# Patient Record
Sex: MALE | Race: BLACK OR AFRICAN AMERICAN
[De-identification: names, ages, dates, MRNs, and addresses within clinical notes are randomized per-mention and may not be internally consistent; named-entity substitution may affect disease eponyms.]

---

## 2017-04-28 ENCOUNTER — HOSPITAL ENCOUNTER (EMERGENCY)
Dept: HOSPITAL 47 - EC | Age: 67
Discharge: HOME | End: 2017-04-28
Payer: MEDICARE

## 2017-04-28 VITALS
RESPIRATION RATE: 18 BRPM | SYSTOLIC BLOOD PRESSURE: 150 MMHG | TEMPERATURE: 97.7 F | DIASTOLIC BLOOD PRESSURE: 81 MMHG | HEART RATE: 55 BPM

## 2017-04-28 DIAGNOSIS — Z79.02: ICD-10-CM

## 2017-04-28 DIAGNOSIS — I73.9: ICD-10-CM

## 2017-04-28 DIAGNOSIS — Z79.899: ICD-10-CM

## 2017-04-28 DIAGNOSIS — K21.9: ICD-10-CM

## 2017-04-28 DIAGNOSIS — E78.5: ICD-10-CM

## 2017-04-28 DIAGNOSIS — Z43.1: Primary | ICD-10-CM

## 2017-04-28 DIAGNOSIS — I69.351: ICD-10-CM

## 2017-04-28 DIAGNOSIS — Z87.01: ICD-10-CM

## 2017-04-28 DIAGNOSIS — Z87.891: ICD-10-CM

## 2017-04-28 DIAGNOSIS — I10: ICD-10-CM

## 2017-04-28 DIAGNOSIS — B19.20: ICD-10-CM

## 2017-04-28 PROCEDURE — 43760: CPT

## 2017-04-28 PROCEDURE — 99283 EMERGENCY DEPT VISIT LOW MDM: CPT

## 2017-04-28 PROCEDURE — 74000: CPT

## 2017-04-28 NOTE — XR
EXAMINATION TYPE: XR KUB portable

 

DATE OF EXAM: 4/28/2017 7:25 PM

 

COMPARISON: 10/30/2016

 

HISTORY: PEG tube evaluation.

 

TECHNIQUE: Single portable supine image was obtained of the mid abdomen after the administration of 2
5 mL of Omnipaque 350 through the percutaneous enteric gastric tube.

 

FINDINGS: Percutaneous enteric gastric tube overlies the mid central abdomen. After instillation of o
ral contrast through the PEG tube there is opacification of bowel, morphologically compatible with a 
gastric antrum and proximal duodenum. No evidence of contrast extravasation. Nonobstructive bowel gas
 pattern.

 

IMPRESSION: Appropriately placed percutaneous enterogastric tube with no evidence of contrast extrava
sation.

## 2017-04-28 NOTE — ED
Recheck HPI





- General


Chief Complaint: Recheck/Abnormal Lab/Rx


Stated Complaint: Peg Tube out


Time Seen by Provider: 17 18:00


Source: EMS, RN notes reviewed, old records reviewed


Mode of arrival: EMS


Limitations: language barrier, altered mental status, physical limitation





- History of Present Illness


Initial Comments: 





Patient is a 66-year-old male presents emergency room for evaluation of PEG 

tube replacement.  Patient was brought here from more worried nursing and rehab 

facility.  Patient has a PEG tube placed.  Apparently while transferring 

patient the PEG tube was pulled out.  Nursing staff at [facility] placed a Sheehan 

catheter until a PEG tube could be replaced.  Patient is nonverbal.  No one is 

present with patient.  Unsure on how long PEG tube has been placed.  Patient 

appears to be in no distress.  Patient has hemiplegia and hemiparesis on the 

right side.





- Related Data


 Home Medications











 Medication  Instructions  Recorded  Confirmed


 


Acetaminophen Tab [Tylenol] 650 mg PEG/G-TUBE Q4H PRN 16


 


Atenolol [Tenormin] 25 mg PEG/G-TUBE QAM 16


 


Cholecalciferol [Vitamin D3] 2,000 unit PEG/G-TUBE DAILY 16


 


Lisinopril 40 mg PEG/G-TUBE QAM 16


 


Clopidogrel [Plavix] 75 mg PEG/G-TUBE HS 10/28/16 04/28/17


 


Furosemide [Lasix] 40 mg PEG/G-TUBE MOWEFR 10/28/16 04/28/17


 


Potassium Chloride Oral Liquid 20 meq PEG/G-TUBE MOWEFR 10/28/16 04/28/17


 


levETIRAcetam [Keppra] 1,000 mg PEG/G-TUBE Q12HR 10/28/16 04/28/17


 


Acetaminophen [Tylenol] 650 mg PEG/G-TUBE DAILY 17


 


Famotidine [Pepcid] 20 mg PEG/G-TUBE BID 17


 


Fenofibrate Nanocrystallized 145 mg PEG/G-TUBE DAILY 17





[Tricor]   


 


Glycerin/Propylene Glycol 1 drop BOTH EYES TID 17





[Artificial Tears Drops]   


 


Vits A and D/White Pet/Lanolin [A 1 applic TOPICAL BID PRN 17





and D Ointment]   


 


amLODIPine [Norvasc] 5 mg PEG/G-TUBE DAILY 17








 Previous Rx's











 Medication  Instructions  Recorded


 


Ipratropium-Albuterol Nebulize 3 ml INHALATION RT-QID  ampul.neb 16





[Duoneb 0.5 mg-3 mg/3 ml Soln]  











 Allergies











Allergy/AdvReac Type Severity Reaction Status Date / Time


 


No Known Allergies Allergy   Verified 17 18:20














Review of Systems


ROS Statement: 


Those systems with pertinent positive or pertinent negative responses have been 

documented in the HPI.





ROS Other: All systems not noted in ROS Statement are negative.





Past Medical History


Past Medical History: COPD, CVA/TIA, GERD/Reflux, Hyperlipidemia, Hypertension, 

Liver Disease, Osteoarthritis (OA), Pneumonia, Prostate Disorder, Renal Disease

, Syncope


Additional Past Medical History / Comment(s): hepatitis c, pvd, Right side 

Flaccid


History of Any Multi-Drug Resistant Organisms: None Reported


Past Surgical History: No Surgical Hx Reported


Additional Past Surgical History / Comment(s): Patient had EGD and colonoscopy 

in the past, PEG tube placed 2016 when had   CVA, liver biopsy


Past Anesthesia/Blood Transfusion Reactions: No Reported Reaction


Past Psychological History: No Psychological Hx Reported


Additional Psychological History / Comment(s): was in [place], had some 

problems with PTSD, nothing current per family, Patient currently nonverbal


Smoking Status: Former smoker


Past Alcohol Use History: None Reported, Daily


Additional Past Alcohol Use History / Comment(s): nothing current


Past Drug Use History: Marijuana


Additional Drug Use History / Comment(s): no drugs used currently





- Past Family History


  ** Mother


Family Medical History: Myocardial Infarction (MI)


Additional Family Medical History / Comment(s): mom  of a heart attack in 70

's





  ** Father


Family Medical History: CVA/TIA, Myocardial Infarction (MI)


Additional Family Medical History / Comment(s): father  at age 71 of a 

stroke





  ** Brother(s)


Family Medical History: CVA/TIA, Liver Disease





  ** Sister(s)


Family Medical History: Vascular Disorder





  ** Daughter(s)


Family Medical History: No Reported History





  ** Son(s)


Additional Family Medical History / Comment(s): Patient has step son and three-

step daughters





General Exam





- General Exam Comments


Initial Comments: 





Laying in exam room, no acute distress.


Limitations: language barrier, altered mental status, physical limitation


General appearance: alert


Head exam: Present: atraumatic, normocephalic, normal inspection


Eye exam: Present: normal appearance


ENT exam: Present: normal exam


Neck exam: Present: normal inspection


Respiratory exam: Present: normal lung sounds bilaterally.  Absent: respiratory 

distress


Cardiovascular Exam: Present: regular rate, normal rhythm, normal heart sounds


GI/Abdominal exam: Present: soft, other (Sheehan catheter placed in upper 

epigastric.  No bleeding noted.  No erythema or swelling noted around the 

ostomy site.).  Absent: distended, tenderness, guarding, rebound, rigid


Extremities exam: Present: normal inspection


Back exam: Present: normal inspection


Neurological exam: Present: alert, altered


Psychiatric exam: Present: normal affect, normal mood


Skin exam: Present: warm, dry, intact, normal color





Course


 Vital Signs











  17





  18:11


 


Temperature 98.3 F


 


Pulse Rate 56 L


 


Respiratory 20





Rate 


 


Blood Pressure 107/61


 


O2 Sat by Pulse 97





Oximetry 














Procedures





- Feeding Tube Replacement


Reason for Replacement: fell out


Type of Tube: G-J Tube


Use of Tube: feedings only


Insertion Site Prior to Procedure: clean


Tube Used for Reinsertion: patient's own


Turkish Tube Size (F): 16


Balloon Size (mls): 20


Verification of Placement: KUB


Tube Secured by: tape/dressing


Patient Tolerated Procedure: well





Medical Decision Making





- Medical Decision Making





Patient is a 66-year-old male presents emergency room for evaluation of feeding 

tube replacement.  Feeding tube has been replaced.  KUB: Appropriately placed 

percutaneous enterogastric tube with no evidence of contrast extravasation.  

Case discussed with Dr. Melton.





- Radiology Data


Radiology results: report reviewed, image reviewed





Disposition


Clinical Impression: 


 Encounter for feeding tube placement





Disposition: HOME SELF-CARE


Condition: Good


Instructions:  How to Use and Care for Your PEG Tube (ED)


Additional Instructions: 


Please follow up with primary care provider in 1-2 days. If any new symptom 

arises or symptoms worsen, return to ER as soon as possible.  


Referrals: 


Austin Harris MD [Primary Care Provider] - 1-2 days


Time of Disposition: 19:06

## 2017-07-17 ENCOUNTER — HOSPITAL ENCOUNTER (OUTPATIENT)
Dept: HOSPITAL 47 - RADCTMAIN | Age: 67
Discharge: HOME | End: 2017-07-17
Payer: MEDICARE

## 2017-07-17 DIAGNOSIS — G31.1: Primary | ICD-10-CM

## 2017-07-17 DIAGNOSIS — I67.82: ICD-10-CM

## 2017-07-17 PROCEDURE — 70450 CT HEAD/BRAIN W/O DYE: CPT

## 2017-07-17 NOTE — CT
EXAMINATION TYPE: CT brain wo con

 

DATE OF EXAM: 7/17/2017

 

HISTORY: Symptomatic epilepsy, history of prior significant stroke

 

CT DLP: 1669.30 mGycm.  Automated Exposure Control for Dose Reduction was Utilized.

 

TECHNIQUE: CT scan of the head is performed without contrast.

 

COMPARISON: CT brain October 29, 2016.

 

FINDINGS:   There is no acute intracranial hemorrhage or midline shift identified. There is diffuse v
entricular and sulcal prominence consistent with diffuse age-related cerebral atrophy.  There is low-
attenuation in the periventricular white matter consistent with chronic small vessel ischemic change.
 Left-sided Encephalomalacia with ex vacuo dilatation of left ventricular system is redemonstrated. T
he globes are intact and the visualized sinuses are clear.   

 

IMPRESSION:  No acute intracranial hemorrhage or midline shift.  There is moderate to severe diffuse 
age-related cerebral atrophy and chronic small vessel ischemic change with old left-sided infarct all
 redemonstrated. No significant change from prior study is seen.

## 2019-01-09 ENCOUNTER — HOSPITAL ENCOUNTER (EMERGENCY)
Dept: HOSPITAL 47 - EC | Age: 69
Discharge: HOME | End: 2019-01-09
Payer: MEDICARE

## 2019-01-09 VITALS
HEART RATE: 74 BPM | RESPIRATION RATE: 20 BRPM | SYSTOLIC BLOOD PRESSURE: 140 MMHG | DIASTOLIC BLOOD PRESSURE: 80 MMHG | TEMPERATURE: 97.8 F

## 2019-01-09 DIAGNOSIS — Z79.899: ICD-10-CM

## 2019-01-09 DIAGNOSIS — G45.9: Primary | ICD-10-CM

## 2019-01-09 DIAGNOSIS — K21.9: ICD-10-CM

## 2019-01-09 DIAGNOSIS — E87.5: ICD-10-CM

## 2019-01-09 DIAGNOSIS — Z86.73: ICD-10-CM

## 2019-01-09 DIAGNOSIS — I73.9: ICD-10-CM

## 2019-01-09 DIAGNOSIS — Z82.3: ICD-10-CM

## 2019-01-09 DIAGNOSIS — J44.9: ICD-10-CM

## 2019-01-09 DIAGNOSIS — Z87.891: ICD-10-CM

## 2019-01-09 DIAGNOSIS — I10: ICD-10-CM

## 2019-01-09 DIAGNOSIS — Z79.02: ICD-10-CM

## 2019-01-09 DIAGNOSIS — E78.5: ICD-10-CM

## 2019-01-09 DIAGNOSIS — Z53.8: ICD-10-CM

## 2019-01-09 LAB
ALBUMIN SERPL-MCNC: 2.4 G/DL (ref 3.5–5)
ALP SERPL-CCNC: 181 U/L (ref 38–126)
ALT SERPL-CCNC: 63 U/L (ref 21–72)
ANION GAP SERPL CALC-SCNC: 2 MMOL/L
APTT BLD: 22.8 SEC (ref 22–30)
AST SERPL-CCNC: 164 U/L (ref 17–59)
BASOPHILS # BLD AUTO: 0 K/UL (ref 0–0.2)
BASOPHILS NFR BLD AUTO: 1 %
BUN SERPL-SCNC: 64 MG/DL (ref 9–20)
CALCIUM SPEC-MCNC: 8.4 MG/DL (ref 8.4–10.2)
CHLORIDE SERPL-SCNC: 109 MMOL/L (ref 98–107)
CK SERPL-CCNC: 668 U/L (ref 55–170)
CO2 SERPL-SCNC: 24 MMOL/L (ref 22–30)
EOSINOPHIL # BLD AUTO: 0.1 K/UL (ref 0–0.7)
EOSINOPHIL NFR BLD AUTO: 3 %
ERYTHROCYTE [DISTWIDTH] IN BLOOD BY AUTOMATED COUNT: 3.17 M/UL (ref 4.3–5.9)
ERYTHROCYTE [DISTWIDTH] IN BLOOD: 14.3 % (ref 11.5–15.5)
GLUCOSE SERPL-MCNC: 121 MG/DL (ref 74–99)
HCT VFR BLD AUTO: 34.2 % (ref 39–53)
HGB BLD-MCNC: 11.1 GM/DL (ref 13–17.5)
INR PPP: 1.1 (ref ?–1.2)
LYMPHOCYTES # SPEC AUTO: 0.9 K/UL (ref 1–4.8)
LYMPHOCYTES NFR SPEC AUTO: 16 %
MCH RBC QN AUTO: 35.1 PG (ref 25–35)
MCHC RBC AUTO-ENTMCNC: 32.5 G/DL (ref 31–37)
MCV RBC AUTO: 108.1 FL (ref 80–100)
MONOCYTES # BLD AUTO: 0.5 K/UL (ref 0–1)
MONOCYTES NFR BLD AUTO: 10 %
NEUTROPHILS # BLD AUTO: 3.6 K/UL (ref 1.3–7.7)
NEUTROPHILS NFR BLD AUTO: 66 %
PH UR: 7 [PH] (ref 5–8)
PLATELET # BLD AUTO: 270 K/UL (ref 150–450)
POTASSIUM SERPL-SCNC: 6.3 MMOL/L (ref 3.5–5.1)
PROT SERPL-MCNC: 6.4 G/DL (ref 6.3–8.2)
PT BLD: 11.5 SEC (ref 9–12)
SODIUM SERPL-SCNC: 135 MMOL/L (ref 137–145)
SP GR UR: 1.01 (ref 1–1.03)
TROPONIN I SERPL-MCNC: <0.012 NG/ML (ref 0–0.03)
UROBILINOGEN UR QL STRIP: 4 MG/DL (ref ?–2)
WBC # BLD AUTO: 5.4 K/UL (ref 3.8–10.6)

## 2019-01-09 PROCEDURE — 84484 ASSAY OF TROPONIN QUANT: CPT

## 2019-01-09 PROCEDURE — 96361 HYDRATE IV INFUSION ADD-ON: CPT

## 2019-01-09 PROCEDURE — 81003 URINALYSIS AUTO W/O SCOPE: CPT

## 2019-01-09 PROCEDURE — 70450 CT HEAD/BRAIN W/O DYE: CPT

## 2019-01-09 PROCEDURE — 71046 X-RAY EXAM CHEST 2 VIEWS: CPT

## 2019-01-09 PROCEDURE — 85025 COMPLETE CBC W/AUTO DIFF WBC: CPT

## 2019-01-09 PROCEDURE — 82550 ASSAY OF CK (CPK): CPT

## 2019-01-09 PROCEDURE — 85730 THROMBOPLASTIN TIME PARTIAL: CPT

## 2019-01-09 PROCEDURE — 83605 ASSAY OF LACTIC ACID: CPT

## 2019-01-09 PROCEDURE — 82553 CREATINE MB FRACTION: CPT

## 2019-01-09 PROCEDURE — 87086 URINE CULTURE/COLONY COUNT: CPT

## 2019-01-09 PROCEDURE — 84132 ASSAY OF SERUM POTASSIUM: CPT

## 2019-01-09 PROCEDURE — 96365 THER/PROPH/DIAG IV INF INIT: CPT

## 2019-01-09 PROCEDURE — 99285 EMERGENCY DEPT VISIT HI MDM: CPT

## 2019-01-09 PROCEDURE — 36415 COLL VENOUS BLD VENIPUNCTURE: CPT

## 2019-01-09 PROCEDURE — 80053 COMPREHEN METABOLIC PANEL: CPT

## 2019-01-09 PROCEDURE — 87040 BLOOD CULTURE FOR BACTERIA: CPT

## 2019-01-09 PROCEDURE — 85610 PROTHROMBIN TIME: CPT

## 2019-01-09 RX ADMIN — NICARDIPINE HYDROCHLORIDE SCH MLS/HR: 2.5 INJECTION INTRAVENOUS at 17:49

## 2019-01-09 NOTE — ED
General Adult HPI





<Tre Dior - Last Filed: 19 19:48>





- General


Source: RN notes reviewed





<Tre Jacques - Last Filed: 19 20:42>





- General


Stated complaint: Altered Mental Status


Time Seen by Provider: 19 15:44





- History of Present Illness


Initial comments: 





This is a 68-year-old male whose daughter brings him into the hospital via EMS.

  Patient has a past medical history significant for a massive stroke with right

-sided facial droop and right-sided paralysis completely according to the 

daughter.  Daughter states the patient no longer speaks no longer eats on his 

own he has a PEG tube and never moves the right side and only occasionally 

moves the left side.  Daughter states this is been ongoing for many months and 

is following up with a neurologist.  Daughter was at the nursing home and took 

the patient to the neurologist today and she thought that the patient was 

having hard time breathing and was having a little bit of a cough and then 

started to drool and she thought maybe the droopiness on the right side of the 

face might of been a little worse.  EMS noted that the patient had 101 fever in 

route.  Patient is unable to give any further history and daughter has no 

further history at this time. (Tre Jacques)





- Related Data


 Home Medications











 Medication  Instructions  Recorded  Confirmed


 


Acetaminophen Tab [Tylenol] 650 mg PEG/G-TUBE Q4H PRN 16


 


Cholecalciferol [Vitamin D3] 2,000 unit PEG/G-TUBE DAILY 16


 


Lisinopril 40 mg PEG/G-TUBE QAM 16


 


Clopidogrel [Plavix] 75 mg PEG/G-TUBE DAILY 10/28/16 01/09/19


 


Potassium Chloride Oral Liquid 20 meq PEG/G-TUBE MOWEFR 10/28/16 01/09/19


 


levETIRAcetam [Keppra] 1,000 mg PEG/G-TUBE Q12HR 10/28/16 01/09/19


 


Acetaminophen [Tylenol] 650 mg PEG/G-TUBE DAILY 17


 


Famotidine [Pepcid] 20 mg PEG/G-TUBE BID 17


 


Fenofibrate Nanocrystallized 145 mg PEG/G-TUBE DAILY 17





[Tricor]   


 


Glycerin/Propylene Glycol 1 drop BOTH EYES TID 17





[Artificial Tears Drops]   


 


Vits A and D/White Pet/Lanolin [A 1 applic TOPICAL BID PRN 17





and D Ointment]   


 


amLODIPine [Norvasc] 5 mg PEG/G-TUBE DAILY 17


 


Ipratropium-Albuterol Nebulize 3 ml INHALATION RT-DAILY PRN 19





[Duoneb 0.5 mg-3 mg/3 ml Soln]   


 


Loratadine [Claritin] 10 mg PEG/G-TUBE DAILY 19


 


Magnesium Hydroxide [Milk of 2,400 mg PO Q72H PRN 19





Magnesia]   


 


Menthol [Biofreeze] 1 applic TOPICAL DAILY PRN 19


 


Metoprolol Tartrate [Lopressor] 25 mg PEG/G-TUBE BID 19


 


lamoTRIgine [LaMICtal] 100 mg PEG/G-TUBE BID 19








 Previous Rx's











 Medication  Instructions  Recorded


 


Ipratropium-Albuterol Nebulize 3 ml INHALATION RT-QID  ampul.neb 16





[Duoneb 0.5 mg-3 mg/3 ml Soln]  











 Allergies











Allergy/AdvReac Type Severity Reaction Status Date / Time


 


No Known Allergies Allergy   Verified 19 18:08














Review of Systems


ROS Other: All systems not noted in ROS Statement are negative.





<Tre Dior - Last Filed: 19 19:48>


ROS Other: All systems not noted in ROS Statement are negative.





<Tre Jacques - Last Filed: 19 20:42>


ROS Statement: 


Those systems with pertinent positive or pertinent negative responses have been 

documented in the HPI.








Past Medical History


Past Medical History: COPD, CVA/TIA, GERD/Reflux, Hyperlipidemia, Hypertension, 

Liver Disease, Osteoarthritis (OA), Pneumonia, Prostate Disorder, Renal Disease

, Syncope


Additional Past Medical History / Comment(s): hepatitis c, pvd, Right side 

Flaccid


History of Any Multi-Drug Resistant Organisms: None Reported


Past Surgical History: No Surgical Hx Reported


Additional Past Surgical History / Comment(s): Patient had EGD and colonoscopy 

in the past, PEG tube placed 2016 when had   CVA, liver biopsy


Past Anesthesia/Blood Transfusion Reactions: No Reported Reaction


Past Psychological History: No Psychological Hx Reported


Additional Psychological History / Comment(s): was in [place], had some 

problems with PTSD, nothing current per family, Patient currently nonverbal


Smoking Status: Former smoker


Past Alcohol Use History: None Reported, Daily


Additional Past Alcohol Use History / Comment(s): nothing current


Past Drug Use History: Marijuana


Additional Drug Use History / Comment(s): no drugs used currently





- Past Family History


  ** Mother


Family Medical History: Myocardial Infarction (MI)


Additional Family Medical History / Comment(s): mom  of a heart attack in 70

's





  ** Father


Family Medical History: CVA/TIA, Myocardial Infarction (MI)


Additional Family Medical History / Comment(s): father  at age 71 of a 

stroke





  ** Brother(s)


Family Medical History: CVA/TIA, Liver Disease





  ** Sister(s)


Family Medical History: Vascular Disorder





  ** Daughter(s)


Family Medical History: No Reported History





  ** Son(s)


Additional Family Medical History / Comment(s): Patient has step son and three-

step daughters





<Tre Jacques - Last Filed: 19 20:42>





General Exam





<Tre Dior - Last Filed: 19 19:48>





<Tre Jacques - Last Filed: 19 20:42>





- General Exam Comments


Initial Comments: 





GENERAL:


Patient is awake but completely unresponsive to any kind of verbal stimuli 

which the daughter states is baseline





ENT:


Neck is soft and supple.  No significant lymphadenopathy is noted.  Oropharynx 

is clear.  Moist mucous membranes.  





EYES:


The sclera were anicteric and conjunctiva were pink and moist.  





PULMONARY:


Patient has shallow breath sounds he is not able to follow commands to give a 

deep breath





CARDIOVASCULAR:


There is a regular rate and rhythm without any murmurs gallops or rubs.  





ABDOMEN:


Soft and nontender with normal bowel sounds.  No palpable organomegaly was 

noted.  There is no palpable pulsatile mass.





SKIN:


Skin is clear with no lesions or rashes and otherwise unremarkable.





NEUROLOGIC:


Patient is alert and oriented 0.  The vehicle to assess cranial nerves as he 

doesn't follow any commands however I do not notice any facial droop.  Patient 

has complete paralysis of the right side.  I was unable to assess strength of 

the left side because patient follows no commands.  Daughter stated that he 

normally does not move the left side very often and when he does it's only very 

little bit.  Patient was not drooling while I was in the room.  Sensation could 

not be assessed again because patient did not follow any commands or respond in 

anyway





MUSCULOSKELETAL:


Unable to assess





LYMPHATICS:


No significant lymphadenopathy is noted





PSYCHIATRIC:


Unable to assess since patient does not respond to any verbal commands or 

conversation and he is unable to speak (Tre Jacques)


 Vital Signs











  19





  16:02 17:41 19:13


 


Temperature 98.8 F  


 


Pulse Rate 66  73


 


Respiratory 26 H 20 24





Rate   


 


Blood Pressure 119/59  153/96


 


O2 Sat by Pulse 98  100





Oximetry   














  19





  20:11


 


Temperature 


 


Pulse Rate 81


 


Respiratory 21





Rate 


 


Blood Pressure 132/64


 


O2 Sat by Pulse 97





Oximetry 














Medical Decision Making





- Lab Data


Result diagrams: 


 19 17:15





 19 17:15





<Tre Dior B - Last Filed: 19 19:48>





- Lab Data


Result diagrams: 


 19 17:15





 19 20:10





<Tre Jacques - Last Filed: 19 20:42>





- Medical Decision Making





Dr. Dior will be taking over the care of this patient at 5pm








Repeat potassium was done after the patient was determined have a high 

potassium was slightly hemolyzed 6.1 second time the patient is given 30 g of 

Kayexalate via the PEG tube and Dr. Harris was contacted Dr. Harris states that 

he can be sent back to the nursing home and he will follow-up the potassium in 

the morning. (Tre Jacques)





- Lab Data


 Lab Results











  19 Range/Units





  17:15 17:15 17:15 


 


WBC   5.4   (3.8-10.6)  k/uL


 


RBC   3.17 L   (4.30-5.90)  m/uL


 


Hgb   11.1 L   (13.0-17.5)  gm/dL


 


Hct   34.2 L   (39.0-53.0)  %


 


MCV   108.1 H   (80.0-100.0)  fL


 


MCH   35.1 H   (25.0-35.0)  pg


 


MCHC   32.5   (31.0-37.0)  g/dL


 


RDW   14.3   (11.5-15.5)  %


 


Plt Count   270   (150-450)  k/uL


 


Neutrophils %   66   %


 


Lymphocytes %   16   %


 


Monocytes %   10   %


 


Eosinophils %   3   %


 


Basophils %   1   %


 


Neutrophils #   3.6   (1.3-7.7)  k/uL


 


Lymphocytes #   0.9 L   (1.0-4.8)  k/uL


 


Monocytes #   0.5   (0-1.0)  k/uL


 


Eosinophils #   0.1   (0-0.7)  k/uL


 


Basophils #   0.0   (0-0.2)  k/uL


 


Macrocytosis   Moderate   


 


PT     (9.0-12.0)  sec


 


INR     (<1.2)  


 


APTT     (22.0-30.0)  sec


 


Sodium    135 L  (137-145)  mmol/L


 


Potassium    6.3 H*  (3.5-5.1)  mmol/L


 


Chloride    109 H  ()  mmol/L


 


Carbon Dioxide    24  (22-30)  mmol/L


 


Anion Gap    2  mmol/L


 


BUN    64 H  (9-20)  mg/dL


 


Creatinine    2.28 H  (0.66-1.25)  mg/dL


 


Est GFR (CKD-EPI)AfAm    33  (>60 ml/min/1.73 sqM)  


 


Est GFR (CKD-EPI)NonAf    28  (>60 ml/min/1.73 sqM)  


 


Glucose    121 H  (74-99)  mg/dL


 


Plasma Lactic Acid Con     (0.7-2.0)  mmol/L


 


Calcium    8.4  (8.4-10.2)  mg/dL


 


Total Bilirubin    1.3  (0.2-1.3)  mg/dL


 


AST    164 H  (17-59)  U/L


 


ALT    63  (21-72)  U/L


 


Alkaline Phosphatase    181 H  ()  U/L


 


Total Creatine Kinase  668 H    ()  U/L


 


CK-MB (CK-2)  0.6    (0.0-2.4)  ng/mL


 


CK-MB (CK-2) Rel Index  0.1    


 


Troponin I  <0.012    (0.000-0.034)  ng/mL


 


Total Protein    6.4  (6.3-8.2)  g/dL


 


Albumin    2.4 L  (3.5-5.0)  g/dL


 


Urine Color     


 


Urine Appearance     (Clear)  


 


Urine pH     (5.0-8.0)  


 


Ur Specific Gravity     (1.001-1.035)  


 


Urine Protein     (Negative)  


 


Urine Glucose (UA)     (Negative)  


 


Urine Ketones     (Negative)  


 


Urine Blood     (Negative)  


 


Urine Nitrite     (Negative)  


 


Urine Bilirubin     (Negative)  


 


Urine Urobilinogen     (<2.0)  mg/dL


 


Ur Leukocyte Esterase     (Negative)  














  19 Range/Units





  17:15 17:15 17:15 


 


WBC     (3.8-10.6)  k/uL


 


RBC     (4.30-5.90)  m/uL


 


Hgb     (13.0-17.5)  gm/dL


 


Hct     (39.0-53.0)  %


 


MCV     (80.0-100.0)  fL


 


MCH     (25.0-35.0)  pg


 


MCHC     (31.0-37.0)  g/dL


 


RDW     (11.5-15.5)  %


 


Plt Count     (150-450)  k/uL


 


Neutrophils %     %


 


Lymphocytes %     %


 


Monocytes %     %


 


Eosinophils %     %


 


Basophils %     %


 


Neutrophils #     (1.3-7.7)  k/uL


 


Lymphocytes #     (1.0-4.8)  k/uL


 


Monocytes #     (0-1.0)  k/uL


 


Eosinophils #     (0-0.7)  k/uL


 


Basophils #     (0-0.2)  k/uL


 


Macrocytosis     


 


PT   11.5   (9.0-12.0)  sec


 


INR   1.1   (<1.2)  


 


APTT   22.8   (22.0-30.0)  sec


 


Sodium     (137-145)  mmol/L


 


Potassium     (3.5-5.1)  mmol/L


 


Chloride     ()  mmol/L


 


Carbon Dioxide     (22-30)  mmol/L


 


Anion Gap     mmol/L


 


BUN     (9-20)  mg/dL


 


Creatinine     (0.66-1.25)  mg/dL


 


Est GFR (CKD-EPI)AfAm     (>60 ml/min/1.73 sqM)  


 


Est GFR (CKD-EPI)NonAf     (>60 ml/min/1.73 sqM)  


 


Glucose     (74-99)  mg/dL


 


Plasma Lactic Acid Con  1.5    (0.7-2.0)  mmol/L


 


Calcium     (8.4-10.2)  mg/dL


 


Total Bilirubin     (0.2-1.3)  mg/dL


 


AST     (17-59)  U/L


 


ALT     (21-72)  U/L


 


Alkaline Phosphatase     ()  U/L


 


Total Creatine Kinase     ()  U/L


 


CK-MB (CK-2)     (0.0-2.4)  ng/mL


 


CK-MB (CK-2) Rel Index     


 


Troponin I     (0.000-0.034)  ng/mL


 


Total Protein     (6.3-8.2)  g/dL


 


Albumin     (3.5-5.0)  g/dL


 


Urine Color    Yellow  


 


Urine Appearance    Clear  (Clear)  


 


Urine pH    7.0  (5.0-8.0)  


 


Ur Specific Gravity    1.013  (1.001-1.035)  


 


Urine Protein    Negative  (Negative)  


 


Urine Glucose (UA)    Negative  (Negative)  


 


Urine Ketones    Negative  (Negative)  


 


Urine Blood    Negative  (Negative)  


 


Urine Nitrite    Negative  (Negative)  


 


Urine Bilirubin    Negative  (Negative)  


 


Urine Urobilinogen    4.0  (<2.0)  mg/dL


 


Ur Leukocyte Esterase    Negative  (Negative)  














  19 Range/Units





  20:10 


 


WBC   (3.8-10.6)  k/uL


 


RBC   (4.30-5.90)  m/uL


 


Hgb   (13.0-17.5)  gm/dL


 


Hct   (39.0-53.0)  %


 


MCV   (80.0-100.0)  fL


 


MCH   (25.0-35.0)  pg


 


MCHC   (31.0-37.0)  g/dL


 


RDW   (11.5-15.5)  %


 


Plt Count   (150-450)  k/uL


 


Neutrophils %   %


 


Lymphocytes %   %


 


Monocytes %   %


 


Eosinophils %   %


 


Basophils %   %


 


Neutrophils #   (1.3-7.7)  k/uL


 


Lymphocytes #   (1.0-4.8)  k/uL


 


Monocytes #   (0-1.0)  k/uL


 


Eosinophils #   (0-0.7)  k/uL


 


Basophils #   (0-0.2)  k/uL


 


Macrocytosis   


 


PT   (9.0-12.0)  sec


 


INR   (<1.2)  


 


APTT   (22.0-30.0)  sec


 


Sodium   (137-145)  mmol/L


 


Potassium  6.1 H*  (3.5-5.1)  mmol/L


 


Chloride   ()  mmol/L


 


Carbon Dioxide   (22-30)  mmol/L


 


Anion Gap   mmol/L


 


BUN   (9-20)  mg/dL


 


Creatinine   (0.66-1.25)  mg/dL


 


Est GFR (CKD-EPI)AfAm   (>60 ml/min/1.73 sqM)  


 


Est GFR (CKD-EPI)NonAf   (>60 ml/min/1.73 sqM)  


 


Glucose   (74-99)  mg/dL


 


Plasma Lactic Acid Con   (0.7-2.0)  mmol/L


 


Calcium   (8.4-10.2)  mg/dL


 


Total Bilirubin   (0.2-1.3)  mg/dL


 


AST   (17-59)  U/L


 


ALT   (21-72)  U/L


 


Alkaline Phosphatase   ()  U/L


 


Total Creatine Kinase   ()  U/L


 


CK-MB (CK-2)   (0.0-2.4)  ng/mL


 


CK-MB (CK-2) Rel Index   


 


Troponin I   (0.000-0.034)  ng/mL


 


Total Protein   (6.3-8.2)  g/dL


 


Albumin   (3.5-5.0)  g/dL


 


Urine Color   


 


Urine Appearance   (Clear)  


 


Urine pH   (5.0-8.0)  


 


Ur Specific Gravity   (1.001-1.035)  


 


Urine Protein   (Negative)  


 


Urine Glucose (UA)   (Negative)  


 


Urine Ketones   (Negative)  


 


Urine Blood   (Negative)  


 


Urine Nitrite   (Negative)  


 


Urine Bilirubin   (Negative)  


 


Urine Urobilinogen   (<2.0)  mg/dL


 


Ur Leukocyte Esterase   (Negative)  














Disposition


Is patient prescribed a controlled substance at d/c from ED?: No





<Tre Dior - Last Filed: 19 19:48>





<Tre Jacques - Last Filed: 19 20:42>


Clinical Impression: 


 TIA (transient ischemic attack), Hyperkalemia





Disposition: HOME SELF-CARE


Condition: Good


Instructions:  Transient Ischemic Attack (ED), Hyperkalemia (ED)


Referrals: 


Austin Harris MD [Primary Care Provider] - 1-2 days

## 2019-01-09 NOTE — CT
EXAMINATION TYPE: CT brain wo con

 

DATE OF EXAM: 1/9/2019

 

COMPARISON: 7/17/2017

 

HISTORY: AMS, hx of stroke

 

CT DLP: 1121.4 mGycm

Automated exposure control for dose reduction was used.

 

FINDINGS: 

There is severe diffuse cerebral atrophy. There is patchy hypodensity in the white matter left pariet
al lobe and left frontal lobe. There is similar change in the right frontal lobe. There is no mass ef
fect nor midline shift. There is no sign of intracranial hemorrhage. The calvarium is intact.

 

IMPRESSION: 

ADVANCED ATROPHY. CHRONIC SMALL VESSEL ISCHEMIA. NO ACUTE INTRACRANIAL ABNORMALITY. NO CHANGE.

## 2019-01-09 NOTE — XR
EXAMINATION TYPE: XR chest 2V

 

DATE OF EXAM: 1/9/2019

 

COMPARISON: 8/7/2016

 

HISTORY: Fever

 

TECHNIQUE:  Frontal and lateral views of the chest are obtained.

 

FINDINGS:  There is no heart failure nor confluent pneumonic infiltrate. Costophrenic angles are anup
r. There is poor inspiration. There are chest leads.

 

IMPRESSION:  Very poor inspiration that is worse than last exam. No heart failure.